# Patient Record
Sex: MALE | Race: BLACK OR AFRICAN AMERICAN | NOT HISPANIC OR LATINO | URBAN - METROPOLITAN AREA
[De-identification: names, ages, dates, MRNs, and addresses within clinical notes are randomized per-mention and may not be internally consistent; named-entity substitution may affect disease eponyms.]

---

## 2020-07-20 ENCOUNTER — TELEPHONE ENCOUNTER (OUTPATIENT)
Dept: URBAN - METROPOLITAN AREA CLINIC 92 | Facility: CLINIC | Age: 35
End: 2020-07-20

## 2020-07-20 ENCOUNTER — OFFICE VISIT (OUTPATIENT)
Dept: URBAN - METROPOLITAN AREA TELEHEALTH 2 | Facility: TELEHEALTH | Age: 35
End: 2020-07-20
Payer: COMMERCIAL

## 2020-07-20 DIAGNOSIS — R11.2 NAUSEA AND VOMITING: ICD-10-CM

## 2020-07-20 DIAGNOSIS — B17.10 HEPATITIS C: ICD-10-CM

## 2020-07-20 DIAGNOSIS — B18.2 CARRIER OF VIRAL HEPATITIS C: ICD-10-CM

## 2020-07-20 DIAGNOSIS — B20 HIV (HUMAN IMMUNODEFICIENCY VIRUS INFECTION): ICD-10-CM

## 2020-07-20 DIAGNOSIS — R94.5 ABNORMAL LFTS (LIVER FUNCTION TESTS): ICD-10-CM

## 2020-07-20 PROCEDURE — 1036F TOBACCO NON-USER: CPT | Performed by: INTERNAL MEDICINE

## 2020-07-20 PROCEDURE — G8420 CALC BMI NORM PARAMETERS: HCPCS | Performed by: INTERNAL MEDICINE

## 2020-07-20 PROCEDURE — 99213 OFFICE O/P EST LOW 20 MIN: CPT | Performed by: INTERNAL MEDICINE

## 2020-07-20 PROCEDURE — 82247 BILIRUBIN TOTAL: CPT | Performed by: INTERNAL MEDICINE

## 2020-07-20 PROCEDURE — 83883 ASSAY NEPHELOMETRY NOT SPEC: CPT | Performed by: INTERNAL MEDICINE

## 2020-07-20 PROCEDURE — 82977 ASSAY OF GGT: CPT | Performed by: INTERNAL MEDICINE

## 2020-07-20 PROCEDURE — 83520 IMMUNOASSAY QUANT NOS NONAB: CPT | Performed by: INTERNAL MEDICINE

## 2020-07-20 PROCEDURE — G8427 DOCREV CUR MEDS BY ELIG CLIN: HCPCS | Performed by: INTERNAL MEDICINE

## 2020-07-20 PROCEDURE — G9903 PT SCRN TBCO ID AS NON USER: HCPCS | Performed by: INTERNAL MEDICINE

## 2020-07-20 RX ORDER — ONDANSETRON 4 MG/1
1 TABLET ON THE TONGUE AND ALLOW TO DISSOLVE TABLET, ORALLY DISINTEGRATING ORAL
Qty: 30 | Refills: 1 | OUTPATIENT
Start: 2020-07-20

## 2020-07-20 RX ORDER — ONDANSETRON 4 MG/1
1 TABLET ON THE TONGUE AND ALLOW TO DISSOLVE TABLET, ORALLY DISINTEGRATING ORAL
Qty: 30 | Refills: 1 | OUTPATIENT

## 2020-07-20 NOTE — HPI-TODAY'S VISIT:
the patient follows up today by a tele conference.  I saw him in 2016 for genotype 1 A chronic hepatitis-C.  We prescribed for him a course of Harvoni. he tells me that he completed the course, unfortunately, he never did follow-up with me after the initial visit.  He tells me over the last 3 months he has been having regular nausea and vomiting.  He vomits at least 1 time per day.  He has tried some Rolaids and some famotidine over the counter but that is not helped him.  He tells me that recently blood work was done and showed hepatitis-C was still present.  Also, he has lost about 30 lb over the last 3 months because he is not eating as much as he use to because he has loss of appetite.  He continues to take Triumeq for well-controlled HIV.

## 2020-07-29 ENCOUNTER — LAB OUTSIDE AN ENCOUNTER (OUTPATIENT)
Dept: URBAN - METROPOLITAN AREA CLINIC 105 | Facility: CLINIC | Age: 35
End: 2020-07-29

## 2020-08-03 ENCOUNTER — OFFICE VISIT (OUTPATIENT)
Dept: URBAN - METROPOLITAN AREA SURGERY CENTER 16 | Facility: SURGERY CENTER | Age: 35
End: 2020-08-03

## 2020-08-19 ENCOUNTER — OUT OF OFFICE VISIT (OUTPATIENT)
Dept: URBAN - METROPOLITAN AREA MEDICAL CENTER 33 | Facility: MEDICAL CENTER | Age: 35
End: 2020-08-19
Payer: COMMERCIAL

## 2020-08-19 DIAGNOSIS — Z21 ASYMPTOMATIC HIV INFECTION: ICD-10-CM

## 2020-08-19 DIAGNOSIS — B18.2 CARRIER OF VIRAL HEPATITIS C: ICD-10-CM

## 2020-08-19 DIAGNOSIS — D75.89 BONE MARROW SUPPRESSION: ICD-10-CM

## 2020-08-19 DIAGNOSIS — K92.0 BLOODY EMESIS: ICD-10-CM

## 2020-08-19 PROCEDURE — 99244 OFF/OP CNSLTJ NEW/EST MOD 40: CPT | Performed by: INTERNAL MEDICINE

## 2020-08-20 ENCOUNTER — OUT OF OFFICE VISIT (OUTPATIENT)
Dept: URBAN - METROPOLITAN AREA MEDICAL CENTER 33 | Facility: MEDICAL CENTER | Age: 35
End: 2020-08-20
Payer: COMMERCIAL

## 2020-08-20 DIAGNOSIS — K20.8 ESOPHAGITIS, LOS ANGELES GRADE A: ICD-10-CM

## 2020-08-20 DIAGNOSIS — K29.60 ADENOPAPILLOMATOSIS GASTRICA: ICD-10-CM

## 2020-08-20 DIAGNOSIS — R11.2 ACUTE NAUSEA WITH NONBILIOUS VOMITING: ICD-10-CM

## 2020-08-20 PROCEDURE — 43239 EGD BIOPSY SINGLE/MULTIPLE: CPT | Performed by: INTERNAL MEDICINE

## 2020-08-24 LAB
ALPHA 2-MACROGLOBULINS, QN: 117
ALT (SGPT) P5P: 117
APOLIPOPROTEIN A-1: 122
BILIRUBIN, TOTAL: 0.2
COMMENT:: (no result)
FIBROSIS SCORE: 0.09
FIBROSIS SCORING:: (no result)
FIBROSIS STAGE: (no result)
GGT: 217
HAPTOGLOBIN: 169
HCV GENOSURE(R) NS3/4A: (no result)
HCV GENOSURE(R) NS3/4A: (no result)
HCV LOG10: 5.9
HCV LOG10: 5.93
HEP B CORE AB, TOT: NEGATIVE
HEPATITIS C GENOTYPE: (no result)
HEPATITIS C QUANTITATION: (no result)
HEPATITIS C QUANTITATION: (no result)
INTERPRETATIONS:: (no result)
LIMITATIONS:: (no result)
Lab: (no result)
NECROINFLAMM ACTIVITY SCORING:: (no result)
NECROINFLAMMAT ACTIVITY GRADE: (no result)
NECROINFLAMMAT ACTIVITY SCORE: 0.55
TEST INFORMATION:: (no result)
TEST INFORMATION:: (no result)

## 2020-08-25 ENCOUNTER — TELEPHONE ENCOUNTER (OUTPATIENT)
Dept: URBAN - METROPOLITAN AREA CLINIC 92 | Facility: CLINIC | Age: 35
End: 2020-08-25

## 2020-08-25 RX ORDER — SOFOSBUVIR, VELPATASVIR, AND VOXILAPREVIR 400; 100; 100 MG/1; MG/1; MG/1
1 TABLET WITH FOOD TABLET, FILM COATED ORAL ONCE A DAY
Qty: 84 TABLET | Refills: 0 | OUTPATIENT
Start: 2020-08-25 | End: 2020-11-16

## 2020-09-08 ENCOUNTER — WEB ENCOUNTER (OUTPATIENT)
Dept: URBAN - METROPOLITAN AREA CLINIC 105 | Facility: CLINIC | Age: 35
End: 2020-09-08

## 2020-09-22 ENCOUNTER — WEB ENCOUNTER (OUTPATIENT)
Dept: URBAN - METROPOLITAN AREA CLINIC 105 | Facility: CLINIC | Age: 35
End: 2020-09-22

## 2021-01-13 ENCOUNTER — TELEPHONE ENCOUNTER (OUTPATIENT)
Dept: URBAN - METROPOLITAN AREA CLINIC 105 | Facility: CLINIC | Age: 36
End: 2021-01-13

## 2021-04-05 ENCOUNTER — OFFICE VISIT (OUTPATIENT)
Dept: URBAN - METROPOLITAN AREA CLINIC 105 | Facility: CLINIC | Age: 36
End: 2021-04-05
Payer: COMMERCIAL

## 2021-04-05 ENCOUNTER — WEB ENCOUNTER (OUTPATIENT)
Dept: URBAN - METROPOLITAN AREA CLINIC 105 | Facility: CLINIC | Age: 36
End: 2021-04-05

## 2021-04-05 VITALS
HEIGHT: 71 IN | WEIGHT: 195.2 LBS | BODY MASS INDEX: 27.33 KG/M2 | HEART RATE: 90 BPM | DIASTOLIC BLOOD PRESSURE: 98 MMHG | SYSTOLIC BLOOD PRESSURE: 153 MMHG | TEMPERATURE: 96.4 F

## 2021-04-05 DIAGNOSIS — B18.2 CHRONIC HEPATITIS C WITHOUT HEPATIC COMA: ICD-10-CM

## 2021-04-05 DIAGNOSIS — B20 HIV INFECTION, UNSPECIFIED SYMPTOM STATUS: ICD-10-CM

## 2021-04-05 DIAGNOSIS — K21.00 GASTROESOPHAGEAL REFLUX DISEASE WITH ESOPHAGITIS WITHOUT HEMORRHAGE: ICD-10-CM

## 2021-04-05 PROBLEM — 128302006: Status: ACTIVE | Noted: 2021-04-05

## 2021-04-05 PROCEDURE — 99213 OFFICE O/P EST LOW 20 MIN: CPT | Performed by: INTERNAL MEDICINE

## 2021-04-05 RX ORDER — PANTOPRAZOLE SODIUM 40 MG/1
1 TABLET TABLET, DELAYED RELEASE ORAL ONCE A DAY
Qty: 90 TABLET | Refills: 3 | OUTPATIENT
Start: 2021-04-05

## 2021-04-05 RX ORDER — ONDANSETRON 4 MG/1
1 TABLET ON THE TONGUE AND ALLOW TO DISSOLVE TABLET, ORALLY DISINTEGRATING ORAL
Qty: 30 | Refills: 1 | COMMUNITY
Start: 2020-07-20

## 2021-04-05 RX ORDER — ONDANSETRON 4 MG/1
1 TABLET ON THE TONGUE AND ALLOW TO DISSOLVE TABLET, ORALLY DISINTEGRATING ORAL
Qty: 30 | Refills: 1 | COMMUNITY

## 2021-04-05 NOTE — HPI-TODAY'S VISIT:
Pt comes for evaluation of hepatitis C. Hepatitis C was previously treated. I prescribed Vosevi for him. He took 8 weeks consecutively then there was a month gap due to an insurance change then he completed the last month.  - He was having some issues with N/V after brushing his teeth. he started having heartburn and nausea. he also reports having some "feeling in the back of his tongue". when he brushes his teeth.

## 2021-04-09 LAB
HCV LOG10: (no result)
HEPATITIS C QUANTITATION: (no result)
TEST INFORMATION:: (no result)

## 2022-05-16 ENCOUNTER — OFFICE VISIT (OUTPATIENT)
Dept: URBAN - METROPOLITAN AREA CLINIC 105 | Facility: CLINIC | Age: 37
End: 2022-05-16
Payer: COMMERCIAL

## 2022-05-16 DIAGNOSIS — Z86.19 HISTORY OF HEPATITIS C: ICD-10-CM

## 2022-05-16 DIAGNOSIS — R19.7 DIARRHEA OF PRESUMED INFECTIOUS ORIGIN: ICD-10-CM

## 2022-05-16 DIAGNOSIS — B20 HUMAN IMMUNODEFICIENCY VIRUS (HIV) DISEASE: ICD-10-CM

## 2022-05-16 DIAGNOSIS — R79.89 ABNORMAL LFTS: ICD-10-CM

## 2022-05-16 PROBLEM — 86406008: Status: ACTIVE | Noted: 2022-05-16

## 2022-05-16 PROCEDURE — 99214 OFFICE O/P EST MOD 30 MIN: CPT | Performed by: INTERNAL MEDICINE

## 2022-05-16 RX ORDER — PANTOPRAZOLE SODIUM 40 MG/1
1 TABLET TABLET, DELAYED RELEASE ORAL ONCE A DAY
Qty: 90 TABLET | Refills: 3 | Status: ACTIVE | COMMUNITY
Start: 2021-04-05

## 2022-05-16 RX ORDER — ONDANSETRON 4 MG/1
1 TABLET ON THE TONGUE AND ALLOW TO DISSOLVE TABLET, ORALLY DISINTEGRATING ORAL
Qty: 30 | Refills: 1 | COMMUNITY

## 2022-05-16 RX ORDER — ONDANSETRON 4 MG/1
1 TABLET ON THE TONGUE AND ALLOW TO DISSOLVE TABLET, ORALLY DISINTEGRATING ORAL
Qty: 30 | Refills: 1 | COMMUNITY
Start: 2020-07-20

## 2022-05-16 NOTE — HPI-TODAY'S VISIT:
Pt comes for evaluation of hepatitis C. Hepatitis C was previously treated. I prescribed Vosevi for him. He took 8 weeks consecutively then there was a month gap due to an insurance change then he completed the last month.  - He was having some issues with N/V after brushing his teeth. he started having heartburn and nausea. he also reports having some "feeling in the back of his tongue". when he brushes his teeth. -  05/16/2022: Patient comes today because his PCP reported that he did have some abnormal liver enzymes.  He does tell me that he drinks most days just about every day.  He has been working from home and this has facilitated him being able to drink more regularly.  We did send a PCR to evaluate his previously treated hepatitis-C and that was negative. -   He tells me that he has been doing much better and has not been using any type of illegal substance but he does continue to drink most days.  His HIV is very well controlled on treatment -  he tells me that he has been having intermittent episodes of diarrhea.  His stools are not predictable.

## 2022-05-17 ENCOUNTER — LAB OUTSIDE AN ENCOUNTER (OUTPATIENT)
Dept: URBAN - METROPOLITAN AREA CLINIC 105 | Facility: CLINIC | Age: 37
End: 2022-05-17

## 2022-05-18 ENCOUNTER — OFFICE VISIT (OUTPATIENT)
Dept: URBAN - METROPOLITAN AREA TELEHEALTH 2 | Facility: TELEHEALTH | Age: 37
End: 2022-05-18

## 2022-05-18 ENCOUNTER — TELEPHONE ENCOUNTER (OUTPATIENT)
Dept: URBAN - METROPOLITAN AREA CLINIC 105 | Facility: CLINIC | Age: 37
End: 2022-05-18

## 2022-05-18 LAB
ALBUMIN: 4.3
ALKALINE PHOSPHATASE: 85
ALT (SGPT): 73
AST (SGOT): 39
BILIRUBIN, DIRECT: 0.12
BILIRUBIN, TOTAL: 0.3
HCV LOG10: 5.86
HEPATITIS C QUANTITATION: (no result)
Lab: (no result)
PROTEIN, TOTAL: 6.9
TEST INFORMATION:: (no result)

## 2022-05-18 RX ORDER — SOFOSBUVIR, VELPATASVIR, AND VOXILAPREVIR 400; 100; 100 MG/1; MG/1; MG/1
1 TABLET WITH FOOD TABLET, FILM COATED ORAL ONCE A DAY
Qty: 84 | Refills: 0 | OUTPATIENT
Start: 2022-05-18 | End: 2022-05-28

## 2022-05-20 LAB
CALPROTECTIN, STOOL - QDX: (no result)
FECAL FAT, QUALITATIVE: (no result)
GASTROINTESTINAL PATHOGEN: (no result)
PANCREATICELASTASE ELISA, STOOL: (no result)

## 2022-07-05 ENCOUNTER — OFFICE VISIT (OUTPATIENT)
Dept: URBAN - METROPOLITAN AREA CLINIC 105 | Facility: CLINIC | Age: 37
End: 2022-07-05
Payer: COMMERCIAL

## 2022-07-05 VITALS
HEART RATE: 91 BPM | TEMPERATURE: 96.9 F | BODY MASS INDEX: 23.88 KG/M2 | SYSTOLIC BLOOD PRESSURE: 92 MMHG | WEIGHT: 170.6 LBS | DIASTOLIC BLOOD PRESSURE: 50 MMHG | HEIGHT: 71 IN

## 2022-07-05 DIAGNOSIS — B19.20 HEPATITIS C VIRUS INFECTION WITHOUT HEPATIC COMA, UNSPECIFIED CHRONICITY: ICD-10-CM

## 2022-07-05 DIAGNOSIS — B20 HIV INFECTION, UNSPECIFIED SYMPTOM STATUS: ICD-10-CM

## 2022-07-05 DIAGNOSIS — R79.89 ABNORMAL LFTS: ICD-10-CM

## 2022-07-05 PROCEDURE — 99213 OFFICE O/P EST LOW 20 MIN: CPT | Performed by: INTERNAL MEDICINE

## 2022-07-05 RX ORDER — ONDANSETRON 4 MG/1
1 TABLET ON THE TONGUE AND ALLOW TO DISSOLVE TABLET, ORALLY DISINTEGRATING ORAL
Qty: 30 | Refills: 1 | COMMUNITY

## 2022-07-05 RX ORDER — ONDANSETRON 4 MG/1
1 TABLET ON THE TONGUE AND ALLOW TO DISSOLVE TABLET, ORALLY DISINTEGRATING ORAL
Qty: 30 | Refills: 1 | COMMUNITY
Start: 2020-07-20

## 2022-07-05 RX ORDER — PANTOPRAZOLE SODIUM 40 MG/1
1 TABLET TABLET, DELAYED RELEASE ORAL ONCE A DAY
Qty: 90 TABLET | Refills: 3 | Status: ACTIVE | COMMUNITY
Start: 2021-04-05

## 2022-07-05 NOTE — PHYSICAL EXAM GASTROINTESTINAL
Abdomen , soft, nontender, nondistended , no guarding or rigidity , no masses palpable , normal bowel sounds , Liver and Spleen , no hepatomegaly present , no hepatosplenomegaly , liver nontender , spleen not palpable
N/A

## 2022-07-05 NOTE — HPI-TODAY'S VISIT:
Pt comes for evaluation of hepatitis C. Hepatitis C was previously treated. I prescribed Vosevi for him. He took 8 weeks consecutively then there was a month gap due to an insurance change then he completed the last month.  - He was having some issues with N/V after brushing his teeth. he started having heartburn and nausea. he also reports having some "feeling in the back of his tongue". when he brushes his teeth. -  05/16/2022: Patient comes today because his PCP reported that he did have some abnormal liver enzymes.  He does tell me that he drinks most days just about every day.  He has been working from home and this has facilitated him being able to drink more regularly.  We did send a PCR to evaluate his previously treated hepatitis-C and that was negative. -   He tells me that he has been doing much better and has not been using any type of illegal substance but he does continue to drink most days.  His HIV is very well controlled on treatment -  he tells me that he has been having intermittent episodes of diarrhea.  His stools are not predictable. -  07/05/2022:  In May of 2022 I last evaluated this patient.  He completed in the past and interrupted course of vosevi for chronic hepatitis-C.  There was a gap between the 2nd and 3rd months due to insurance coverage.  PCR at the end of his previous treatment was negative for any detectable virus indicating cure. His liver enzymes were yet again elevated  and hep C PCR showed positive hep C viremia again a 2nd course of vosevi was prescribed.  At that time he was also having intermittent loose stools.  Stool studies were positive for a minimally elevated calprotectin and a minimally decreased fecal elastase. -  07/05/2022: Patient is doing well.  He has completed 1 month of vosevi and is almost MCC through the 2nd bottle.  The plan is to complete a 12 week course.  He has missed no doses has no side effects

## 2022-07-25 ENCOUNTER — LAB OUTSIDE AN ENCOUNTER (OUTPATIENT)
Dept: URBAN - METROPOLITAN AREA CLINIC 105 | Facility: CLINIC | Age: 37
End: 2022-07-25

## 2022-07-28 ENCOUNTER — TELEPHONE ENCOUNTER (OUTPATIENT)
Dept: URBAN - METROPOLITAN AREA CLINIC 105 | Facility: CLINIC | Age: 37
End: 2022-07-28

## 2022-07-28 LAB
ALBUMIN/GLOBULIN RATIO: 1.7
ALBUMIN: 4.9
ALKALINE PHOSPHATASE: 89
ALT (SGPT): 47
AST (SGOT): 64
BILIRUBIN, DIRECT: 0.1
BILIRUBIN, INDIRECT: 0.5
BILIRUBIN, TOTAL: 0.6
COMMENT: (no result)
GLOBULIN: 2.9
HCV RNA, QUANTITATIVE REAL TIME PCR: (no result)
HCV RNA, QUANTITATIVE REAL TIME PCR: (no result)
HEPATITIS C ANTIBODY: REACTIVE
INDEX: 28.6
PROTEIN, TOTAL: 7.8

## 2022-07-29 ENCOUNTER — TELEPHONE ENCOUNTER (OUTPATIENT)
Dept: URBAN - METROPOLITAN AREA CLINIC 95 | Facility: CLINIC | Age: 37
End: 2022-07-29

## 2022-09-01 ENCOUNTER — OFFICE VISIT (OUTPATIENT)
Dept: URBAN - METROPOLITAN AREA CLINIC 105 | Facility: CLINIC | Age: 37
End: 2022-09-01
Payer: COMMERCIAL

## 2022-09-01 VITALS
DIASTOLIC BLOOD PRESSURE: 80 MMHG | HEIGHT: 71 IN | BODY MASS INDEX: 23.91 KG/M2 | HEART RATE: 83 BPM | TEMPERATURE: 98.2 F | WEIGHT: 170.8 LBS | SYSTOLIC BLOOD PRESSURE: 118 MMHG

## 2022-09-01 DIAGNOSIS — B20 HIV INFECTION, UNSPECIFIED SYMPTOM STATUS: ICD-10-CM

## 2022-09-01 DIAGNOSIS — R79.89 ABNORMAL LFTS: ICD-10-CM

## 2022-09-01 DIAGNOSIS — B18.2 HEP C W/O COMA, CHRONIC: ICD-10-CM

## 2022-09-01 DIAGNOSIS — I10 HTN (HYPERTENSION), BENIGN: ICD-10-CM

## 2022-09-01 PROBLEM — 128302006: Status: ACTIVE | Noted: 2022-09-01

## 2022-09-01 PROBLEM — 10725009: Status: ACTIVE | Noted: 2022-09-01

## 2022-09-01 PROCEDURE — 99213 OFFICE O/P EST LOW 20 MIN: CPT | Performed by: INTERNAL MEDICINE

## 2022-09-01 RX ORDER — ONDANSETRON 4 MG/1
1 TABLET ON THE TONGUE AND ALLOW TO DISSOLVE TABLET, ORALLY DISINTEGRATING ORAL
Qty: 30 | Refills: 1 | Status: ON HOLD | COMMUNITY
Start: 2020-07-20

## 2022-09-01 RX ORDER — ONDANSETRON 4 MG/1
1 TABLET ON THE TONGUE AND ALLOW TO DISSOLVE TABLET, ORALLY DISINTEGRATING ORAL
Qty: 30 | Refills: 1 | Status: ON HOLD | COMMUNITY

## 2022-09-01 RX ORDER — PANTOPRAZOLE SODIUM 40 MG/1
1 TABLET TABLET, DELAYED RELEASE ORAL ONCE A DAY
Qty: 90 TABLET | Refills: 3 | Status: ON HOLD | COMMUNITY
Start: 2021-04-05

## 2022-09-01 NOTE — HPI-TODAY'S VISIT:
Pt comes for evaluation of hepatitis C. Hepatitis C was previously treated. I prescribed Vosevi for him. He took 8 weeks consecutively then there was a month gap due to an insurance change then he completed the last month.  - He was having some issues with N/V after brushing his teeth. he started having heartburn and nausea. he also reports having some "feeling in the back of his tongue". when he brushes his teeth. -  05/16/2022: Patient comes today because his PCP reported that he did have some abnormal liver enzymes.  He does tell me that he drinks most days just about every day.  He has been working from home and this has facilitated him being able to drink more regularly.  We did send a PCR to evaluate his previously treated hepatitis-C and that was negative. -   He tells me that he has been doing much better and has not been using any type of illegal substance but he does continue to drink most days.  His HIV is very well controlled on treatment -  he tells me that he has been having intermittent episodes of diarrhea.  His stools are not predictable. -  07/05/2022:  In May of 2022 I last evaluated this patient.  He completed in the past and interrupted course of vosevi for chronic hepatitis-C.  There was a gap between the 2nd and 3rd months due to insurance coverage.  PCR at the end of his previous treatment was negative for any detectable virus indicating cure. His liver enzymes were yet again elevated  and hep C PCR showed positive hep C viremia again a 2nd course of vosevi was prescribed.  At that time he was also having intermittent loose stools.  Stool studies were positive for a minimally elevated calprotectin and a minimally decreased fecal elastase. -  07/05/2022: Patient is doing well.  He has completed 1 month of vosevi and is almost MCC through the 2nd bottle.  The plan is to complete a 12 week course.  He has missed no doses has no side effects -   09/01/2022: Patient comes for follow-up.  He has completed 12 weeks of vosevi. at his last visit I sent a quantitative hepatitis-C PCR it was reported as detected but less than 15 which is the lower limit of the sensitivity of the assay - he thinks that he missed two doses that fell out of his pill dispenser  and he eventually took it.

## 2022-09-03 ENCOUNTER — TELEPHONE ENCOUNTER (OUTPATIENT)
Dept: URBAN - METROPOLITAN AREA CLINIC 105 | Facility: CLINIC | Age: 37
End: 2022-09-03

## 2022-09-03 LAB
ALBUMIN/GLOBULIN RATIO: 1.9
ALBUMIN: 4.9
ALKALINE PHOSPHATASE: 78
ALT (SGPT): 18
AST (SGOT): 18
BILIRUBIN, DIRECT: 0.1
BILIRUBIN, INDIRECT: 0.3
BILIRUBIN, TOTAL: 0.4
GLOBULIN: 2.6
HCV RNA, QUANTITATIVE: <1.18
HCV RNA, QUANTITATIVE: <15
PROTEIN, TOTAL: 7.5

## 2022-10-06 ENCOUNTER — OFFICE VISIT (OUTPATIENT)
Dept: URBAN - METROPOLITAN AREA CLINIC 105 | Facility: CLINIC | Age: 37
End: 2022-10-06
Payer: COMMERCIAL

## 2022-10-06 ENCOUNTER — DASHBOARD ENCOUNTERS (OUTPATIENT)
Age: 37
End: 2022-10-06

## 2022-10-06 VITALS
TEMPERATURE: 97.5 F | BODY MASS INDEX: 24.56 KG/M2 | SYSTOLIC BLOOD PRESSURE: 124 MMHG | HEART RATE: 88 BPM | HEIGHT: 71 IN | DIASTOLIC BLOOD PRESSURE: 87 MMHG | WEIGHT: 175.4 LBS

## 2022-10-06 DIAGNOSIS — Z86.19 HISTORY OF HEPATITIS C: ICD-10-CM

## 2022-10-06 DIAGNOSIS — B20 HIV INFECTION, UNSPECIFIED SYMPTOM STATUS: ICD-10-CM

## 2022-10-06 PROBLEM — 86406008: Status: ACTIVE | Noted: 2021-04-05

## 2022-10-06 PROCEDURE — 99213 OFFICE O/P EST LOW 20 MIN: CPT | Performed by: INTERNAL MEDICINE

## 2022-10-06 RX ORDER — PANTOPRAZOLE SODIUM 40 MG/1
1 TABLET TABLET, DELAYED RELEASE ORAL ONCE A DAY
Qty: 90 TABLET | Refills: 3 | Status: ON HOLD | COMMUNITY
Start: 2021-04-05

## 2022-10-06 RX ORDER — ONDANSETRON 4 MG/1
1 TABLET ON THE TONGUE AND ALLOW TO DISSOLVE TABLET, ORALLY DISINTEGRATING ORAL
Qty: 30 | Refills: 1 | Status: ON HOLD | COMMUNITY

## 2022-10-06 RX ORDER — ONDANSETRON 4 MG/1
1 TABLET ON THE TONGUE AND ALLOW TO DISSOLVE TABLET, ORALLY DISINTEGRATING ORAL
Qty: 30 | Refills: 1 | Status: ON HOLD | COMMUNITY
Start: 2020-07-20

## 2022-10-09 LAB
HCV RNA, QUANTITATIVE: <1.18
HCV RNA, QUANTITATIVE: <15

## 2022-10-10 ENCOUNTER — TELEPHONE ENCOUNTER (OUTPATIENT)
Dept: URBAN - METROPOLITAN AREA CLINIC 105 | Facility: CLINIC | Age: 37
End: 2022-10-10

## 2022-11-01 ENCOUNTER — OFFICE VISIT (OUTPATIENT)
Dept: URBAN - METROPOLITAN AREA CLINIC 105 | Facility: CLINIC | Age: 37
End: 2022-11-01

## 2022-11-08 NOTE — PHYSICAL EXAM CARDIOVASCULAR:
no edema,  no murmurs,  regular rate and rhythm , no edema.
[de-identified] : Right knee: Inc healed.  No effusion.  No redness.  ROM 0-120.  Cane.  Post knee pain at end of flexion.  Mild trendelenberg gait.\par \par Lspine: Right paraspinal tenderness.  Neg SLR.  NVI.